# Patient Record
Sex: FEMALE | Race: WHITE | NOT HISPANIC OR LATINO | Employment: UNEMPLOYED | URBAN - METROPOLITAN AREA
[De-identification: names, ages, dates, MRNs, and addresses within clinical notes are randomized per-mention and may not be internally consistent; named-entity substitution may affect disease eponyms.]

---

## 2017-01-03 ENCOUNTER — HOSPITAL ENCOUNTER (EMERGENCY)
Facility: HOSPITAL | Age: 47
Discharge: HOME | End: 2017-01-03
Attending: EMERGENCY MEDICINE
Payer: MEDICAID

## 2017-01-03 VITALS
OXYGEN SATURATION: 99 % | SYSTOLIC BLOOD PRESSURE: 104 MMHG | TEMPERATURE: 98 F | BODY MASS INDEX: 37.76 KG/M2 | RESPIRATION RATE: 16 BRPM | WEIGHT: 200 LBS | HEIGHT: 61 IN | DIASTOLIC BLOOD PRESSURE: 64 MMHG | HEART RATE: 59 BPM

## 2017-01-03 DIAGNOSIS — R07.9 CHEST PAIN, UNSPECIFIED TYPE: Primary | ICD-10-CM

## 2017-01-03 LAB
ALBUMIN SERPL BCP-MCNC: 3.8 G/DL
ALP SERPL-CCNC: 84 U/L
ALT SERPL W/O P-5'-P-CCNC: 13 U/L
ANION GAP SERPL CALC-SCNC: 7 MMOL/L
AST SERPL-CCNC: 14 U/L
BASOPHILS # BLD AUTO: 0.07 K/UL
BASOPHILS NFR BLD: 0.7 %
BILIRUB SERPL-MCNC: 0.2 MG/DL
BNP SERPL-MCNC: 31 PG/ML
BUN SERPL-MCNC: 23 MG/DL
CALCIUM SERPL-MCNC: 8.9 MG/DL
CHLORIDE SERPL-SCNC: 110 MMOL/L
CK SERPL-CCNC: 71 U/L
CO2 SERPL-SCNC: 24 MMOL/L
CREAT SERPL-MCNC: 1 MG/DL
DIFFERENTIAL METHOD: NORMAL
EOSINOPHIL # BLD AUTO: 0.4 K/UL
EOSINOPHIL NFR BLD: 3.5 %
ERYTHROCYTE [DISTWIDTH] IN BLOOD BY AUTOMATED COUNT: 14 %
EST. GFR  (AFRICAN AMERICAN): >60 ML/MIN/1.73 M^2
EST. GFR  (NON AFRICAN AMERICAN): >60 ML/MIN/1.73 M^2
GLUCOSE SERPL-MCNC: 94 MG/DL
HCT VFR BLD AUTO: 38.2 %
HGB BLD-MCNC: 12.3 G/DL
INR PPP: 0.9
LYMPHOCYTES # BLD AUTO: 2.8 K/UL
LYMPHOCYTES NFR BLD: 28.4 %
MAGNESIUM SERPL-MCNC: 2.3 MG/DL
MCH RBC QN AUTO: 29.6 PG
MCHC RBC AUTO-ENTMCNC: 32.2 %
MCV RBC AUTO: 92 FL
MONOCYTES # BLD AUTO: 0.7 K/UL
MONOCYTES NFR BLD: 6.7 %
NEUTROPHILS # BLD AUTO: 6 K/UL
NEUTROPHILS NFR BLD: 60.4 %
PLATELET # BLD AUTO: 286 K/UL
PMV BLD AUTO: 10.6 FL
POCT GLUCOSE: 101 MG/DL (ref 70–110)
POTASSIUM SERPL-SCNC: 4.7 MMOL/L
PROT SERPL-MCNC: 6.8 G/DL
PROTHROMBIN TIME: 10 SEC
RBC # BLD AUTO: 4.16 M/UL
SODIUM SERPL-SCNC: 141 MMOL/L
TROPONIN I SERPL DL<=0.01 NG/ML-MCNC: <0.006 NG/ML
WBC # BLD AUTO: 9.91 K/UL

## 2017-01-03 PROCEDURE — 82550 ASSAY OF CK (CPK): CPT

## 2017-01-03 PROCEDURE — 83880 ASSAY OF NATRIURETIC PEPTIDE: CPT

## 2017-01-03 PROCEDURE — 84484 ASSAY OF TROPONIN QUANT: CPT

## 2017-01-03 PROCEDURE — 99284 EMERGENCY DEPT VISIT MOD MDM: CPT | Mod: 25

## 2017-01-03 PROCEDURE — 80053 COMPREHEN METABOLIC PANEL: CPT

## 2017-01-03 PROCEDURE — 85025 COMPLETE CBC W/AUTO DIFF WBC: CPT

## 2017-01-03 PROCEDURE — 83735 ASSAY OF MAGNESIUM: CPT

## 2017-01-03 PROCEDURE — 85610 PROTHROMBIN TIME: CPT

## 2017-01-03 PROCEDURE — 93005 ELECTROCARDIOGRAM TRACING: CPT

## 2017-01-03 PROCEDURE — 82962 GLUCOSE BLOOD TEST: CPT

## 2017-01-03 RX ORDER — ISOSORBIDE MONONITRATE 30 MG/1
30 TABLET, EXTENDED RELEASE ORAL DAILY
COMMUNITY

## 2017-01-03 RX ORDER — NITROGLYCERIN 0.4 MG/1
0.4 TABLET SUBLINGUAL EVERY 5 MIN PRN
Qty: 25 TABLET | Refills: 6 | Status: SHIPPED | OUTPATIENT
Start: 2017-01-03 | End: 2018-01-03

## 2017-01-03 RX ORDER — BUPROPION HYDROCHLORIDE 150 MG/1
150 TABLET ORAL DAILY
COMMUNITY

## 2017-01-03 RX ORDER — POTASSIUM CHLORIDE 750 MG/1
10 TABLET, EXTENDED RELEASE ORAL DAILY
COMMUNITY

## 2017-01-03 RX ORDER — ASPIRIN 325 MG
325 TABLET ORAL DAILY
COMMUNITY

## 2017-01-03 RX ORDER — LISINOPRIL 40 MG/1
40 TABLET ORAL 2 TIMES DAILY
COMMUNITY

## 2017-01-03 RX ORDER — METOPROLOL TARTRATE 50 MG/1
50 TABLET ORAL 2 TIMES DAILY
COMMUNITY

## 2017-01-03 NOTE — ED PROVIDER NOTES
Encounter Date: 1/3/2017       History     Chief Complaint   Patient presents with    Chest Pain     left side chest pain radiating to left jaw since waking this morning, denies n/v or diaphoresis but CP relieved by NTG per EMS     Review of patient's allergies indicates:   Allergen Reactions    Augmentin  [amoxicillin-pot clavulanate]      Other reaction(s): Rash     HPI   Ms. Warner is a 47 yo female with PMHx of HTN, HLD, DM and CAD s/p stent placement (2015 and 2016) presenting with complains of left sided chest pain. Patient states that her chest pain started last night while she was cooking. She states that the pain was 4/10 at the time and didn't think much of it at the time, therefore did not seek any medical attention. Patient states that the pain worsened to 8/10 this AM, therefore called EMS. Her pain was relieved by nitroglycerine given by EMS. Patient describes a stabbing/pressure-like pain in the left chest with radiation to the left shoulder and bilateral jaw. She also reports having blurry vision but otherwise denies any F/C, SOB, abdominal pain or lower extremity pain.    Patient reports being stressed over the past few weeks due to the loss of her friend on 16.    Past Medical History   Diagnosis Date    Anxiety     Coronary artery disease     Depression     Diabetes mellitus     Headache(784.0)     High cholesterol     Hypertension     Migraine headache     Seizures     Umbilical hernia      No past medical history pertinent negatives.  Past Surgical History   Procedure Laterality Date    Hernia repair           Appendectomy      Cholecystectomy      Hysterectomy       section, classic       4 total    Cardiac surgery       Family History   Problem Relation Age of Onset    Throat cancer Father     Heart attack Father     Uterine cancer Mother     Heart attack Mother     Lung cancer Paternal Aunt     Bone cancer Paternal Aunt     Prostate cancer  Paternal Uncle     Diabetes Maternal Grandmother      Social History   Substance Use Topics    Smoking status: Current Every Day Smoker     Packs/day: 0.50     Types: Cigarettes    Smokeless tobacco: None    Alcohol use No     Review of Systems   Constitutional: Negative for chills and fever.   Respiratory: Negative for cough, shortness of breath and wheezing.    Cardiovascular: Positive for chest pain. Negative for leg swelling.   Gastrointestinal: Negative for abdominal pain, nausea and vomiting.   Musculoskeletal: Negative for back pain and myalgias.   Neurological: Positive for headaches. Negative for dizziness.   Psychiatric/Behavioral: Positive for dysphoric mood. The patient is not nervous/anxious and is not hyperactive.      Physical Exam   Initial Vitals   BP Pulse Resp Temp SpO2   01/03/17 1054 01/03/17 1054 01/03/17 1054 01/03/17 1054 01/03/17 1054   130/80 74 18 98.1 °F (36.7 °C) 98 %     Physical Exam    Constitutional: She appears well-developed and well-nourished. She is not diaphoretic. No distress.   HENT:   Head: Normocephalic and atraumatic.   Eyes: EOM are normal. Pupils are equal, round, and reactive to light. No scleral icterus.   Cardiovascular: Normal rate, regular rhythm and normal heart sounds.   No murmur heard.  Pulmonary/Chest: Breath sounds normal. No respiratory distress. She has no wheezes. She exhibits tenderness (Left chest TTP).   Abdominal: Soft. Bowel sounds are normal. She exhibits no distension. There is no tenderness.   Musculoskeletal: Normal range of motion. She exhibits no edema or tenderness.   Neurological: She is alert and oriented to person, place, and time.   Psychiatric: Her speech is normal. Judgment and thought content normal. She is slowed. Cognition and memory are normal. She exhibits a depressed mood.         ED Course   Procedures  Labs Reviewed   COMPREHENSIVE METABOLIC PANEL - Abnormal; Notable for the following:        Result Value    BUN, Bld 23 (*)      Anion Gap 7 (*)     All other components within normal limits   CBC W/ AUTO DIFFERENTIAL   CK   TROPONIN I   PROTIME-INR   B-TYPE NATRIURETIC PEPTIDE   MAGNESIUM   POCT GLUCOSE     Imaging Results         X-Ray Chest 1 View (Final result) Result time:  01/03/17 11:57:08    Final result by Teresa Brock MD (01/03/17 11:57:08)    Impression:      No acute cardiopulmonary process identified.      Electronically signed by: TERESA BROCK MD  Date:     01/03/17  Time:    11:57     Narrative:    Chest AP single view.  Comparison: 7/2015.    Mediastinal structures are midline.  Cardiac silhouette is normal and stable in size.  Suspected coronary artery stent noted.  Lungs are clear and symmetrically expanded.  No evidence of focal consolidative process, pneumothorax, or significant effusion.  Bones appear intact.  No free air visualized beneath the diaphragm.                      Medical Decision Making:   Clinical Tests:   Lab Tests: Ordered and Reviewed  The following lab test(s) were unremarkable: CBC, CMP and Troponin  Radiological Study: Ordered and Reviewed  Medical Tests: Ordered and Reviewed  ED Management:  46-year-old female stable angina.  Workup here in the ED is unremarkable. Results have been discussed with Dr. Manley, patient's cardiologist, who agrees she may be safely discharged to home.  Patient has an appointment with Dr. Manley tomorrow which is been urged to keep.  She'll also return here for any further chest pain occurring prior to follow-up.                   ED Course     Clinical Impression:   The encounter diagnosis was Chest pain, unspecified type.          Gil Saldaña MD  01/03/17 0394

## 2017-01-03 NOTE — ED NOTES
Pt reports chest pain that stared last night. Rated 4/10 last night and woke up this morning with same pain, but rated 8/10 and with vision changes. States that she was out of nitro and called her cardiologist to refill it who told her to come to the ED for evaluation. In route, EMS administered ASA 325mg, Nitro SL x 5 sprays, and 1 inch on nitro paste on the chest. Pt now rates her pain 1/10. Denies SOB, N/V, and headache. VSS.    APPEARANCE: Alert, oriented and in no acute distress.  CARDIAC: Normal rate and rhythm, no murmur heard.   PERIPHERAL VASCULAR: peripheral pulses present. Normal cap refill. No edema. Warm to touch.    RESPIRATORY:Normal rate and effort, breath sounds clear bilaterally throughout chest. Respirations are equal and unlabored no obvious signs of distress.  GASTRO: soft, bowel sounds normal, no tenderness, no abdominal distention.  MUSC: Full ROM. No bony tenderness or soft tissue tenderness. No obvious deformity.  SKIN: Skin is warm and dry, normal skin turgor, mucous membranes moist.  NEURO: 5/5 strength major flexors/extensors bilaterally. Sensory intact to light touch bilaterally. Adam coma scale: eyes open spontaneously-4, oriented & converses-5, obeys commands-6. No neurological abnormalities.   MENTAL STATUS: awake, alert and aware of environment.  EYE: PERRL, both eyes: pupils brisk and reactive to light. Normal size.  ENT: EARS: no obvious drainage. NOSE: no active bleeding.

## 2017-01-03 NOTE — DISCHARGE INSTRUCTIONS
Stable Angina  The chest discomfort you have appears to be coming from your heart -- a condition called angina. Angina is a pain in the heart due to poor blood flow to the heart muscle. This can occur when plaque builds up on the artery wall or a blood clot forms in one or more of the small blood vessels that deliver oxygen to the heart muscle. Plaque is a fatty material made up of cholesterol, calcium deposits, and other materials  and other particles due to inflammation.  Exercise, increased activity, emotional upset, or stress can trigger this pain. With proper treatment and lifestyle changes to reduce risk factors, most people with angina are able to maintain a full and active life.  Angina is not a heart attack. But if angina pain is severe or prolonged, it is a sign of an impending heart attack, also called acute myocardial infarction, or AMI. Your angina is under control at this time. Therefore, it is safe for you to go home. Follow up as instructed by your doctor for any further tests and office visits.    Home care  · Rest at home today and avoid any emotional or physically strenuous activity.  · Take medicine (usually nitroglycerin) for chest pain exactly as prescribed. Keep your nitroglycerin with you at all times.  · When taking nitroglycerin for angina, sit or lie down. The medicine may make you feel dizzy.  ¨ Place one tablet under your tongue, or between your lip and gum, or between your cheek and gum. Let the tablet dissolve completely; do not chew or swallow the tablet.  ¨ If you use a spray, then spray once on or under your tongue. Do not inhale. Right after you use the spray, close your mouth. Wait a few seconds before you swallow.  ¨ After taking one tablet or spraying once, continue sitting or lying for 5 minutes.  ¨ If the angina goes away completely, rest awhile and continue your normal routine.  Note: Your healthcare provider may give you slightly different instructions than those above. If  so, follow them carefully.  Prevention  · Learn how to take your own blood pressure. Keep a record of your results. Ask your doctor which readings mean that you need medical attention. Reduce salt intake and follow lifestyle change instructions if you have high blood pressure (hypertension).  · Maintain a healthy weight. Get help to lose any extra pounds. Talk to your doctor about a safe diet program. Sudden large weight losses can be dangerous.  · If you have diabetes, talk to your doctor about healthy control of your blood sugar.  · Begin an exercise program. Ask your doctor how to get started. You can benefit from simple activities such as walking or gardening. Short, high intensity exercise sessions may also be beneficial.  · Break the smoking habit. Enroll in a stop-smoking program to improve your chances of success.  · Get adequate rest.  · Avoid stressful situations. Learn stress-management techniques.  Follow-up care  Follow up with your doctor, or as advised.  If an X-ray was done , it will be reviewed by another specialist. You will be notified of any new findings that may affect your care.  Call 911  This is the fastest and safest way to get to the nearest emergency department. The paramedics can also start treatment on the way to the hospital, saving valuable time for your heart.  · If angina gets worse, it continues, or if it stops and returns, call 911 immediately, Do not delay. You may be having a heart attack.  · After you call 911, take a second nitroglycerine tablet or spray unless instructed otherwise. When repeating doses, sit down if possible because it can make you feel lightheaded or dizzy. Wait another 5 minutes. If the angina still does not go away, take a third tablet or spray. Do not take more than 3 tablets or sprays within 15 minutes. Stay on the phone with 911 for further instructions.  · Your healthcare provider may give you slightly different instructions than those above. If so,  "follow them carefully.  Don't wait until your symptoms are severe to call 911. Other reasons to call 911 besides chest pain include:  · Trouble breathing  · Feeling lightheaded, faint, or dizzy  · Rapid heart beat  · Slower than usual heart rate compared to your normal  · Angina with weakness, dizziness, fainting, heavy sweating, nausea, or vomiting  · Extreme drowsiness, or confusion  · Weakness of an arm or leg or on one side of the face  · Difficulty with speech or vision  When to seek medical advice  Remember, the signs and symptoms of a heart attack are not always like they are on TV. Sometimes they are not so obvious. You may only feel weak or just "not right." If it is not clear or if you have any doubt, call for advice.  · Seek help if there is a change in the type of pain, if it feels different, or if your symptoms are mild.  · Do not drive yourself. Have someone else drive. If no one can drive you, call 911.  · If your doctor has given you medicine to take when you have symptoms, take them, but do not delay getting  help.  · Do not delay. Fast diagnosis and treatment can prevent or  limit the amount of heart damage during a heart attack or stroke.  · Do not go to your doctor's office or a clinic because they may not be able to provide all the testing and treatment you need.  © 8741-6084 [employer]. [address] [place] [address], [place] [address]. All rights reserved. This information is not intended as a substitute for professional medical care. Always follow your healthcare professional's instructions.        "

## 2017-01-03 NOTE — ED AVS SNAPSHOT
[facility]  [address] [facility]  [place] [address]               Dominga Warner   1/3/2017 10:55 AM   ED    Description:  Female : 1970   Department:  [facility]           Your Care was Coordinated By:     Provider Role From To    Gil Saldaña MD Attending Provider 17 1100 --      Reason for Visit     Chest Pain           Diagnoses this Visit        Comments    Chest pain, unspecified type    -  Primary       ED Disposition     None           To Do List           Follow-up Information     Follow up with Felicitas Manley MD.    Specialty:  Cardiology    Contact information:    [address]  [address]  [place] [address]  [phone]         These Medications        Disp Refills Start End    nitroGLYCERIN (NITROSTAT) 0.4 MG SL tablet 25 tablet 6 1/3/2017 1/3/2018    Place 1 tablet (0.4 mg total) under the tongue every 5 (five) minutes as needed for Chest pain. - Sublingual    Pharmacy: [address] Ph #: [phone]         [facility] On Call     [facility] Care [facility] -  Assistance  Registered nurses in the [facility] provide clinical advisement, health education, appointment booking, and other advisory services.  Call for this free service at [phone].             Medications           Message regarding Medications     Verify the changes and/or additions to your medication regime listed below are the same as discussed with your clinician today.  If any of these changes or additions are incorrect, please notify your healthcare provider.        START taking these NEW medications        Refills    nitroGLYCERIN (NITROSTAT) 0.4 MG SL tablet 6    Sig: Place 1 tablet (0.4 mg total) under the tongue every 5 (five) minutes as needed for Chest pain.    Class: Print    Route: Sublingual      STOP taking these medications     hydrocodone-acetaminophen 10-325mg (NORCO)   mg Tab Take 1 tablet by mouth every 4 (four) hours as needed.    ondansetron (ZOFRAN) 4 MG tablet Take 1 tablet (4 mg total) by mouth every 6 (six) hours as needed for Nausea.    oxycodone-acetaminophen 5-325 mg (PERCOCET) 5-325 mg per tablet Take 1-2 tablets by mouth every 4 (four) hours as needed for Pain.    polyethylene glycol (GLYCOLAX) 17 gram PwPk Take 17 g by mouth once daily.    promethazine (PHENERGAN) 25 MG suppository Place 1 suppository (25 mg total) rectally every 6 (six) hours as needed for Nausea.    psyllium husk, aspartame, (METAMUCIL FIBER SINGLES) 3.4 gram PwPk Take 1 Package by mouth once daily.    ACETAMINOPHEN WITH CODEINE (TYLENOL-CODEINE #3 ORAL) Take by mouth.    butalbital-acetaminop-caf-cod (FIORICET WITH CODEINE) -88-30 mg Cap Take by mouth. 1 Capsule Oral Twice a day    magnesium oxide-Mg AA chelate (MG-PLUS-PROTEIN) 133 mg Tab Take 400 mg by mouth.    METHOCARBAMOL (ROBAXIN ORAL) Take by mouth.    metoprolol succinate (TOPROL-XL) 50 MG 24 hr tablet Take 50 mg by mouth once daily.    pantoprazole (PROTONIX) 40 MG tablet Take 40 mg by mouth once daily.    pravastatin (PRAVACHOL) 40 MG tablet Take 80 mg by mouth. Tablet Oral At bedtime    promethazine (PHENERGAN) 25 MG tablet Take 25 mg by mouth every 4 (four) hours.    zolpidem (AMBIEN) 10 mg Tab Take 5 mg by mouth nightly as needed.    aspirin 162 MG TbEC Take 325 mg by mouth once daily.            Verify that the below list of medications is an accurate representation of the medications you are currently taking.  If none reported, the list may be blank. If incorrect, please contact your healthcare provider. Carry this list with you in case of emergency.           Current Medications     aspirin 325 MG tablet Take 325 mg by mouth once daily.    buPROPion (WELLBUTRIN XL) 150 MG TB24 tablet Take 150 mg by mouth once daily.    cyanocobalamin, vitamin B-12, (B-12 KIT) 1,000 mcg/mL Kit Inject as directed every 7 days.     "isosorbide mononitrate (IMDUR) 30 MG 24 hr tablet Take 30 mg by mouth once daily.    lisinopril (PRINIVIL,ZESTRIL) 40 MG tablet Take 40 mg by mouth 2 (two) times daily.    metoprolol tartrate (LOPRESSOR) 50 MG tablet Take 50 mg by mouth 2 (two) times daily.    potassium chloride SA (K-DUR,KLOR-CON) 10 MEQ tablet Take 10 mEq by mouth once daily.    alprazolam (XANAX) 2 MG tablet Take 2 mg by mouth.  Tablet Oral Twice a day     amlodipine (NORVASC) 10 MG tablet Take 10 mg by mouth once daily.    atorvastatin (LIPITOR) 40 MG tablet Take 40 mg by mouth once daily.    buprenorphine-naloxone 2-0.5 mg (SUBOXONE) 2-0.5 mg Subl Place 8 mg under the tongue 3 (three) times daily.     butalbital-acetaminophen-caffeine -40 mg (FIORICET, ESGIC) -40 mg per tablet Take 1 tablet by mouth every 4 (four) hours as needed for Pain.    CHOLECALCIFEROL, VITAMIN D3, (VITAMIN D3 ORAL) Take 50,000 Units by mouth once a week.     citalopram (CELEXA) 40 MG tablet Take 40 mg by mouth once daily.    clopidogrel (PLAVIX) 75 mg tablet Take 75 mg by mouth once daily.    furosemide (LASIX) 20 MG tablet Take 40 mg by mouth 2 (two) times daily.     nitroGLYCERIN (NITROSTAT) 0.4 MG SL tablet Place 1 tablet (0.4 mg total) under the tongue every 5 (five) minutes as needed for Chest pain.    topiramate (TOPAMAX) 50 MG tablet Take 100 mg by mouth 3 (three) times daily.     trazodone (DESYREL) 50 MG tablet Take 50 mg by mouth every evening.           Clinical Reference Information           Your Vitals Were     BP Pulse Temp Resp Height Weight    130/80 (BP Location: Right arm, Patient Position: Sitting) 74 98.1 °F (36.7 °C) (Oral) 18 5' 1" (1.549 m) 90.7 kg (200 lb)    SpO2 BMI             98% 37.79 kg/m2         Allergies as of 1/3/2017        Reactions    Augmentin  [Amoxicillin-pot Clavulanate]     Other reaction(s): Rash      Immunizations Administered on Date of Encounter - 1/3/2017     None      ED Micro, Lab, POCT     Start Ordered       " Status Ordering Provider    01/03/17 1118 01/03/17 1117  CBC auto differential  STAT      Final result     01/03/17 1118 01/03/17 1117  Comprehensive metabolic panel  STAT      Final result     01/03/17 1118 01/03/17 1117  CPK  STAT      Final result     01/03/17 1118 01/03/17 1117  Troponin I  STAT      Final result     01/03/17 1118 01/03/17 1117  Protime-INR  STAT      Final result     01/03/17 1118 01/03/17 1117  Brain natriuretic peptide  STAT      Final result     01/03/17 1118 01/03/17 1117  Magnesium  STAT      Final result     01/03/17 1108 01/03/17 1108  POCT glucose  Once      Final result       ED Imaging Orders     Start Ordered       Status Ordering Provider    01/03/17 1118 01/03/17 1117  X-Ray Chest 1 View  1 time imaging      Final result         Discharge Instructions         Stable Angina  The chest discomfort you have appears to be coming from your heart -- a condition called angina. Angina is a pain in the heart due to poor blood flow to the heart muscle. This can occur when plaque builds up on the artery wall or a blood clot forms in one or more of the small blood vessels that deliver oxygen to the heart muscle. Plaque is a fatty material made up of cholesterol, calcium deposits, and other materials  and other particles due to inflammation.  Exercise, increased activity, emotional upset, or stress can trigger this pain. With proper treatment and lifestyle changes to reduce risk factors, most people with angina are able to maintain a full and active life.  Angina is not a heart attack. But if angina pain is severe or prolonged, it is a sign of an impending heart attack, also called acute myocardial infarction, or AMI. Your angina is under control at this time. Therefore, it is safe for you to go home. Follow up as instructed by your doctor for any further tests and office visits.    Home care  · Rest at home today and avoid any emotional or physically strenuous activity.  · Take medicine  (usually nitroglycerin) for chest pain exactly as prescribed. Keep your nitroglycerin with you at all times.  · When taking nitroglycerin for angina, sit or lie down. The medicine may make you feel dizzy.  ¨ Place one tablet under your tongue, or between your lip and gum, or between your cheek and gum. Let the tablet dissolve completely; do not chew or swallow the tablet.  ¨ If you use a spray, then spray once on or under your tongue. Do not inhale. Right after you use the spray, close your mouth. Wait a few seconds before you swallow.  ¨ After taking one tablet or spraying once, continue sitting or lying for 5 minutes.  ¨ If the angina goes away completely, rest awhile and continue your normal routine.  Note: Your healthcare provider may give you slightly different instructions than those above. If so, follow them carefully.  Prevention  · Learn how to take your own blood pressure. Keep a record of your results. Ask your doctor which readings mean that you need medical attention. Reduce salt intake and follow lifestyle change instructions if you have high blood pressure (hypertension).  · Maintain a healthy weight. Get help to lose any extra pounds. Talk to your doctor about a safe diet program. Sudden large weight losses can be dangerous.  · If you have diabetes, talk to your doctor about healthy control of your blood sugar.  · Begin an exercise program. Ask your doctor how to get started. You can benefit from simple activities such as walking or gardening. Short, high intensity exercise sessions may also be beneficial.  · Break the smoking habit. Enroll in a stop-smoking program to improve your chances of success.  · Get adequate rest.  · Avoid stressful situations. Learn stress-management techniques.  Follow-up care  Follow up with your doctor, or as advised.  If an X-ray was done , it will be reviewed by another specialist. You will be notified of any new findings that may affect your care.  Call 911  This is  "the fastest and safest way to get to the nearest emergency department. The paramedics can also start treatment on the way to the hospital, saving valuable time for your heart.  · If angina gets worse, it continues, or if it stops and returns, call 911 immediately, Do not delay. You may be having a heart attack.  · After you call 911, take a second nitroglycerine tablet or spray unless instructed otherwise. When repeating doses, sit down if possible because it can make you feel lightheaded or dizzy. Wait another 5 minutes. If the angina still does not go away, take a third tablet or spray. Do not take more than 3 tablets or sprays within 15 minutes. Stay on the phone with 911 for further instructions.  · Your healthcare provider may give you slightly different instructions than those above. If so, follow them carefully.  Don't wait until your symptoms are severe to call 911. Other reasons to call 911 besides chest pain include:  · Trouble breathing  · Feeling lightheaded, faint, or dizzy  · Rapid heart beat  · Slower than usual heart rate compared to your normal  · Angina with weakness, dizziness, fainting, heavy sweating, nausea, or vomiting  · Extreme drowsiness, or confusion  · Weakness of an arm or leg or on one side of the face  · Difficulty with speech or vision  When to seek medical advice  Remember, the signs and symptoms of a heart attack are not always like they are on TV. Sometimes they are not so obvious. You may only feel weak or just "not right." If it is not clear or if you have any doubt, call for advice.  · Seek help if there is a change in the type of pain, if it feels different, or if your symptoms are mild.  · Do not drive yourself. Have someone else drive. If no one can drive you, call 911.  · If your doctor has given you medicine to take when you have symptoms, take them, but do not delay getting  help.  · Do not delay. Fast diagnosis and treatment can prevent or  limit the amount of heart " damage during a heart attack or stroke.  · Do not go to your doctor's office or a clinic because they may not be able to provide all the testing and treatment you need.  © 8418-8262 [employer]. [address] [place] [address], [place] [address]. All rights reserved. This information is not intended as a substitute for professional medical care. Always follow your healthcare professional's instructions.          [facility] Sign-Up     Activating your [facility] account is as easy as 1-2-3!     1) Visit [email or web address], select Sign Up Now, enter this activation code and your date of birth, then select Next.  [email or web address]  Expires: 2/17/2017 12:35 PM      2) Create a username and password to use when you visit [facility] in the future and select a security question in case you lose your password and select Next.    3) Enter your e-mail address and click Sign Up!    Additional Information  If you have questions, please e-mail [email or web address] or call [phone] to talk to our [facility] staff. Remember, [facility] is NOT to be used for urgent needs. For medical emergencies, dial 911.         Smoking Cessation     If you would like to quit smoking:   You may be eligible for free services if you are a [place] resident and started smoking cigarettes before September 1, 1988.  Call the Smoking Cessation Trust (SCT) toll free at (490) 871-2337 or (874) 461-5633.   Call 1-800-QUIT-NOW if you do not meet the above criteria.             [facility] complies with applicable Federal civil rights laws and does not discriminate on the basis of race, color, national origin, age, disability, or sex.        Language Assistance Services     ATTENTION: Language assistance services are available, free of charge. Please call [phone].      ATENCIÓN: Si nunola danny, tiene a suarez disposición servicios gratuitos de asistencia lingüística. Llame al [phone].     CHÚ Ý: N?u b?n nói Ti?ng Vi?t, có  các d?ch v? h? tr? hilario ng? mi?n phí dành cho b?n. G?i s? [phone].

## 2017-01-28 ENCOUNTER — HOSPITAL ENCOUNTER (EMERGENCY)
Facility: HOSPITAL | Age: 47
Discharge: HOME | End: 2017-01-28
Attending: EMERGENCY MEDICINE
Payer: MEDICAID

## 2017-01-28 VITALS
SYSTOLIC BLOOD PRESSURE: 125 MMHG | WEIGHT: 210 LBS | RESPIRATION RATE: 20 BRPM | OXYGEN SATURATION: 95 % | TEMPERATURE: 98 F | DIASTOLIC BLOOD PRESSURE: 84 MMHG | HEIGHT: 70 IN | BODY MASS INDEX: 30.06 KG/M2 | HEART RATE: 81 BPM

## 2017-01-28 DIAGNOSIS — N30.00 ACUTE CYSTITIS WITHOUT HEMATURIA: Primary | ICD-10-CM

## 2017-01-28 LAB
BACTERIA #/AREA URNS AUTO: ABNORMAL /HPF
BILIRUB UR QL STRIP: ABNORMAL
CLARITY UR REFRACT.AUTO: CLEAR
COLOR UR AUTO: ABNORMAL
GLUCOSE UR QL STRIP: NEGATIVE
HGB UR QL STRIP: NEGATIVE
KETONES UR QL STRIP: NEGATIVE
LEUKOCYTE ESTERASE UR QL STRIP: ABNORMAL
MICROSCOPIC COMMENT: ABNORMAL
NITRITE UR QL STRIP: POSITIVE
PH UR STRIP: 7 [PH] (ref 5–8)
PROT UR QL STRIP: ABNORMAL
RBC #/AREA URNS AUTO: 3 /HPF (ref 0–4)
SP GR UR STRIP: 1.02 (ref 1–1.03)
URN SPEC COLLECT METH UR: ABNORMAL
UROBILINOGEN UR STRIP-ACNC: ABNORMAL EU/DL
WBC #/AREA URNS AUTO: 15 /HPF (ref 0–5)

## 2017-01-28 PROCEDURE — 87077 CULTURE AEROBIC IDENTIFY: CPT

## 2017-01-28 PROCEDURE — 87088 URINE BACTERIA CULTURE: CPT

## 2017-01-28 PROCEDURE — 87086 URINE CULTURE/COLONY COUNT: CPT

## 2017-01-28 PROCEDURE — 25000003 PHARM REV CODE 250: Performed by: EMERGENCY MEDICINE

## 2017-01-28 PROCEDURE — 87186 SC STD MICRODIL/AGAR DIL: CPT

## 2017-01-28 PROCEDURE — 81000 URINALYSIS NONAUTO W/SCOPE: CPT

## 2017-01-28 PROCEDURE — 99284 EMERGENCY DEPT VISIT MOD MDM: CPT

## 2017-01-28 RX ORDER — SULFAMETHOXAZOLE AND TRIMETHOPRIM 800; 160 MG/1; MG/1
1 TABLET ORAL 2 TIMES DAILY
Qty: 14 TABLET | Refills: 0 | Status: SHIPPED | OUTPATIENT
Start: 2017-01-28 | End: 2017-01-28 | Stop reason: CLARIF

## 2017-01-28 RX ORDER — HYDROCODONE BITARTRATE AND ACETAMINOPHEN 5; 325 MG/1; MG/1
1 TABLET ORAL
Status: COMPLETED | OUTPATIENT
Start: 2017-01-28 | End: 2017-01-28

## 2017-01-28 RX ORDER — HYDROCODONE BITARTRATE AND ACETAMINOPHEN 5; 325 MG/1; MG/1
1 TABLET ORAL EVERY 6 HOURS PRN
Qty: 4 TABLET | Refills: 0 | Status: SHIPPED | OUTPATIENT
Start: 2017-01-28 | End: 2017-06-22

## 2017-01-28 RX ORDER — SULFAMETHOXAZOLE AND TRIMETHOPRIM 800; 160 MG/1; MG/1
1 TABLET ORAL 2 TIMES DAILY
Qty: 20 TABLET | Refills: 0 | Status: SHIPPED | OUTPATIENT
Start: 2017-01-28 | End: 2017-02-07

## 2017-01-28 RX ADMIN — HYDROCODONE BITARTRATE AND ACETAMINOPHEN 1 TABLET: 5; 325 TABLET ORAL at 08:01

## 2017-01-28 NOTE — ED AVS SNAPSHOT
[facility]  [address]  [place] [address]               Dominga Warner   2017  8:11 PM   ED    Description:  Female : 1970   Department:  [facility]           Your Care was Coordinated By:     Provider Role From To    Eugene Mariano MD Attending Provider 17 --      Reason for Visit     Urinary Retention     Abdominal Pain           Diagnoses this Visit        Comments    Acute cystitis without hematuria    -  Primary       ED Disposition     ED Disposition Condition Comment    Discharge             To Do List           Follow-up Information     Follow up with Patricia Page MD In 2 days.    Specialty:  Internal Medicine    Why:  if not improved    Contact information:    [address] [place] [address] [phone]         These Medications        Disp Refills Start End    sulfamethoxazole-trimethoprim 800-160mg (BACTRIM DS) 800-160 mg Tab 20 tablet 0 2017    Take 1 tablet by mouth 2 (two) times daily. - Oral    Pharmacy: [facility] [address] Ph #: [phone]       hydrocodone-acetaminophen 5-325mg (NORCO) 5-325 mg per tablet 4 tablet 0 2017     Take 1 tablet by mouth every 6 (six) hours as needed for Pain. - Oral    Pharmacy: [place] [facility] [address] [facility] [address] Ph #: [phone]         [facility] On Call     [facility] On Call Nurse Care Line -  Assistance  Registered nurses in the [facility] provide clinical advisement, health education, appointment booking, and other advisory services.  Call for this free service at [phone].             Medications           Message regarding Medications     Verify the changes and/or additions to your medication regime listed below are the same as discussed with your clinician today.  If any of these changes or additions are incorrect, please notify your healthcare provider.        START taking  these NEW medications        Refills    sulfamethoxazole-trimethoprim 800-160mg (BACTRIM DS) 800-160 mg Tab 0    Sig: Take 1 tablet by mouth 2 (two) times daily.    Class: Print    Route: Oral    hydrocodone-acetaminophen 5-325mg (NORCO) 5-325 mg per tablet 0    Sig: Take 1 tablet by mouth every 6 (six) hours as needed for Pain.    Class: Print    Route: Oral      These medications were administered today        Dose Freq    hydrocodone-acetaminophen 5-325mg per tablet 1 tablet 1 tablet ED 1 Time    Sig: Take 1 tablet by mouth ED 1 Time.    Class: Normal    Route: Oral           Verify that the below list of medications is an accurate representation of the medications you are currently taking.  If none reported, the list may be blank. If incorrect, please contact your healthcare provider. Carry this list with you in case of emergency.           Current Medications     topiramate (TOPAMAX) 50 MG tablet Take 100 mg by mouth 3 (three) times daily.     alprazolam (XANAX) 2 MG tablet Take 2 mg by mouth.  Tablet Oral Twice a day     amlodipine (NORVASC) 10 MG tablet Take 10 mg by mouth once daily.    aspirin 325 MG tablet Take 325 mg by mouth once daily.    atorvastatin (LIPITOR) 40 MG tablet Take 40 mg by mouth once daily.    buprenorphine-naloxone 2-0.5 mg (SUBOXONE) 2-0.5 mg Subl Place 8 mg under the tongue 3 (three) times daily.     buPROPion (WELLBUTRIN XL) 150 MG TB24 tablet Take 150 mg by mouth once daily.    butalbital-acetaminophen-caffeine -40 mg (FIORICET, ESGIC) -40 mg per tablet Take 1 tablet by mouth every 4 (four) hours as needed for Pain.    CHOLECALCIFEROL, VITAMIN D3, (VITAMIN D3 ORAL) Take 50,000 Units by mouth once a week.     citalopram (CELEXA) 40 MG tablet Take 40 mg by mouth once daily.    clopidogrel (PLAVIX) 75 mg tablet Take 75 mg by mouth once daily.    cyanocobalamin, vitamin B-12, (B-12 KIT) 1,000 mcg/mL Kit Inject as directed every 7 days.    furosemide (LASIX) 20 MG tablet Take  "40 mg by mouth 2 (two) times daily.     hydrocodone-acetaminophen 5-325mg (NORCO) 5-325 mg per tablet Take 1 tablet by mouth every 6 (six) hours as needed for Pain.    isosorbide mononitrate (IMDUR) 30 MG 24 hr tablet Take 30 mg by mouth once daily.    lisinopril (PRINIVIL,ZESTRIL) 40 MG tablet Take 40 mg by mouth 2 (two) times daily.    metoprolol tartrate (LOPRESSOR) 50 MG tablet Take 50 mg by mouth 2 (two) times daily.    nitroGLYCERIN (NITROSTAT) 0.4 MG SL tablet Place 1 tablet (0.4 mg total) under the tongue every 5 (five) minutes as needed for Chest pain.    potassium chloride SA (K-DUR,KLOR-CON) 10 MEQ tablet Take 10 mEq by mouth once daily.    sulfamethoxazole-trimethoprim 800-160mg (BACTRIM DS) 800-160 mg Tab Take 1 tablet by mouth 2 (two) times daily.    trazodone (DESYREL) 50 MG tablet Take 50 mg by mouth every evening.           Clinical Reference Information           Your Vitals Were     BP Pulse Temp Resp Height Weight    122/59 (BP Location: Right arm, Patient Position: Sitting) 86 98 °F (36.7 °C) (Oral) 18 5' 10" (1.778 m) 95.3 kg (210 lb)    SpO2 BMI             96% 30.13 kg/m2         Allergies as of 1/28/2017        Reactions    Augmentin  [Amoxicillin-pot Clavulanate]     Other reaction(s): Rash      Immunizations Administered on Date of Encounter - 1/28/2017     None      ED Micro, Lab, POCT     Start Ordered       Status Ordering Provider    01/28/17 2049 01/28/17 2048  Urine culture  Add-on      Completed     01/28/17 2046 01/28/17 2045    Once,   Status:  Canceled      Canceled     01/28/17 2046 01/28/17 2045    STAT,   Status:  Canceled      Canceled     01/28/17 2031 01/28/17 2030  Urinalysis Clean Catch  STAT      Final result     01/28/17 2030 01/28/17 2030  Urinalysis Microscopic  Once      Final result     01/28/17 2030 01/28/17 2030  Urine culture  Once      In process       ED Imaging Orders     None        Discharge Instructions         Understanding Urinary Tract Infections " (UTIs)  Most UTIs are caused by bacteria, although they may also be caused by viruses or fungi. Bacteria from the bowel are the most common source of infection. The infection may begin because of any of the following:  · Sexual activity. During sex, germs can travel from the penis, vagina, or rectum into the urethra.   · Germs on the skin outside the rectum may travel into the urethra. This is more common in women since the rectum and urethra are closer to each other than in men. Wiping from front to back after using the toilet and keeping the area clean can help prevent germs from getting to the urethra.  · Blockage of urine flow through the urinary tract. If urine sits too long, germs may begin to grow out of control.      Parts of the urinary tract  The infection can occur in any part of the urinary tract.  · The kidneys collect and store urine.  · The ureters carry urine from the kidneys to the bladder.  · The bladder holds urine until you are ready to let it out.  · The urethra carries urine from the bladder out of the body. It is shorter in women, so bacteria can move through it more easily. The urethra is longer in men, so a UTI is less likely to reach the bladder or kidneys in men.  © 8606-9149 [employer] [address] [place] [address], [place] [address]. All rights reserved. This information is not intended as a substitute for professional medical care. Always follow your healthcare professional's instructions.          Your Scheduled Appointments     Feb 03, 2017  7:00 AM CST   MPI Delay with [facility] NM2 INJ   [facility]    [address] [place] [id number] [phone]            Feb 03, 2017  7:30 AM CST   MPI Delay with [facility] NM3   [facility]    [address]  [place] [address]   [phone]            Feb 03, 2017  8:00 AM CST   Nuclear Regadenonsen with CARDIOLOGY, STRESS/TILT TABLE/PAPI   [facility]    [address] [place], [place] [address]  [place] [address] [phone]              [facility] Sign-Up     Activating your [facility] account is as easy as 1-2-3!     1) Visit [email or web address], select Sign Up Now, enter this activation code and your date of birth, then select Next.  [email or web address]  Expires: 2/17/2017 12:35 PM      2) Create a username and password to use when you visit [facility] in the future and select a security question in case you lose your password and select Next.    3) Enter your e-mail address and click Sign Up!    Additional Information  If you have questions, please e-mail [email or web address] or call [phone] to talk to our [facility] staff. Remember, [facility] is NOT to be used for urgent needs. For medical emergencies, dial 911.         Smoking Cessation     If you would like to quit smoking:   You may be eligible for free services if you are a [place] resident and started smoking cigarettes before September 1, 1988.  Call the Smoking Cessation Trust (SCT) toll free at (700) 429-4296 or (501) 322-5664.   Call 6-056-QUIT-NOW if you do not meet the above criteria.             [facility] complies with applicable Federal civil rights laws and does not discriminate on the basis of race, color, national origin, age, disability, or sex.        Language Assistance Services     ATTENTION: Language assistance services are available, free of charge. Please call [phone].      ATENCIÓN: Si habla español, tiene a suarez disposición servicios gratuitos de asistencia lingüística. Llame al [phone].     CHÚ Ý: N?u b?n nói Ti?ng Vi?t, có các d?ch v? h? tr? ngôn ng? mi?n phí dành cho b?n. G?i s? [phone].

## 2017-01-29 NOTE — DISCHARGE INSTRUCTIONS
Understanding Urinary Tract Infections (UTIs)  Most UTIs are caused by bacteria, although they may also be caused by viruses or fungi. Bacteria from the bowel are the most common source of infection. The infection may begin because of any of the following:  · Sexual activity. During sex, germs can travel from the penis, vagina, or rectum into the urethra.   · Germs on the skin outside the rectum may travel into the urethra. This is more common in women since the rectum and urethra are closer to each other than in men. Wiping from front to back after using the toilet and keeping the area clean can help prevent germs from getting to the urethra.  · Blockage of urine flow through the urinary tract. If urine sits too long, germs may begin to grow out of control.      Parts of the urinary tract  The infection can occur in any part of the urinary tract.  · The kidneys collect and store urine.  · The ureters carry urine from the kidneys to the bladder.  · The bladder holds urine until you are ready to let it out.  · The urethra carries urine from the bladder out of the body. It is shorter in women, so bacteria can move through it more easily. The urethra is longer in men, so a UTI is less likely to reach the bladder or kidneys in men.  © 7095-6624 [employer]. [address] [place] [address], [place] [address]. All rights reserved. This information is not intended as a substitute for professional medical care. Always follow your healthcare professional's instructions.

## 2017-01-29 NOTE — ED TRIAGE NOTES
"Pt states "I feel like I'm going to have 20 babies and my abdomen is narinder really bad. I have been taking Azo Cranberry and the regular Azo. I took Ibuprofen and Tylenol." Pt states pain started yesterday. Pt also c/o left flank pain.   "

## 2017-01-29 NOTE — ED PROVIDER NOTES
"Encounter Date: 2017       History     Chief Complaint   Patient presents with    Urinary Retention     Pt states "I feel like I'm going to have 20 babies and my abdomen is narinder really bad. I have been taking Azo Crandberry and the regular Azo. I took Ibuprofen and Tylenol." Pt states pain started yesterday. Pt also c/o left flank pain.     Abdominal Pain     Review of patient's allergies indicates:   Allergen Reactions    Augmentin  [amoxicillin-pot clavulanate]      Other reaction(s): Rash     Patient is a 46 y.o. female presenting with the following complaint: dysuria.   Dysuria    This is a new problem. The current episode started several days ago. The problem occurs every urination. The problem has been gradually worsening. The quality of the pain is described as burning (CRAMPING, FREQUENCY, URGENCY). The pain is at a severity of 10/10. Associated symptoms include frequency and urgency. Pertinent negatives include no discharge. Treatments tried: AZO, AZO CRANBERRY. Past medical history comments: HERNIA SURGERY, MESH WITH COMPLICATIONS, INTESTINAL RESECTION.     Past Medical History   Diagnosis Date    Anxiety     Coronary artery disease     Depression     Diabetes mellitus     Headache(784.0)     High cholesterol     Hypertension     Migraine headache     Seizures     Umbilical hernia      No past medical history pertinent negatives.  Past Surgical History   Procedure Laterality Date    Hernia repair           Appendectomy      Cholecystectomy      Hysterectomy       section, classic       4 total    Cardiac surgery      Coronary stent placement       Family History   Problem Relation Age of Onset    Throat cancer Father     Heart attack Father     Uterine cancer Mother     Heart attack Mother     Lung cancer Paternal Aunt     Bone cancer Paternal Aunt     Prostate cancer Paternal Uncle     Diabetes Maternal Grandmother      Social History   Substance Use " Topics    Smoking status: Current Every Day Smoker     Packs/day: 0.50     Types: Cigarettes    Smokeless tobacco: None    Alcohol use No     Review of Systems   Constitutional: Negative.    Eyes: Negative.    Respiratory: Positive for cough.    Cardiovascular: Negative.    Gastrointestinal:        DIARRHEA, DIFFUSE CRAMPING   Endocrine: Negative.    Genitourinary: Positive for dysuria, frequency and urgency.   Musculoskeletal: Negative.    Skin: Negative.    Allergic/Immunologic: Negative.    Neurological: Negative.    Psychiatric/Behavioral: Positive for sleep disturbance.        DEPRESSION, GRIEF REACTION, INSOMNIA       Physical Exam   Initial Vitals   BP Pulse Resp Temp SpO2   01/28/17 2014 01/28/17 2014 01/28/17 2014 01/28/17 2014 01/28/17 2014   122/59 86 18 98 °F (36.7 °C) 96 %     Physical Exam    Nursing note and vitals reviewed.  Constitutional: She appears well-developed and well-nourished.   HENT:   Nose: Nose normal.   Mouth/Throat: Oropharynx is clear and moist.   Eyes: EOM are normal. Pupils are equal, round, and reactive to light.   Neck: Normal range of motion. Neck supple.   Cardiovascular: Normal rate, regular rhythm and intact distal pulses.   Pulmonary/Chest: Breath sounds normal.   Abdominal: Soft. Bowel sounds are normal. There is no rebound.   SUPRAPUBIC PAIN (SUBJETIVE BILATERAL UPPER ABD PAIN   Neurological: She is alert. She has normal reflexes.   Skin: Skin is warm and dry.         ED Course   Procedures  Labs Reviewed   URINALYSIS             Medical Decision Making:   Clinical Tests:   Lab Tests: Ordered and Reviewed       <> Summary of Lab: Abnormal us pos nitrate, pos wbc                   ED Course     Clinical Impression:   The encounter diagnosis was Acute cystitis without hematuria.    Disposition:   Disposition: Discharged       Eugene Mariano MD  01/28/17 8555

## 2017-01-29 NOTE — ED TRIAGE NOTES
"Pt presents to ED c/o lower abdominal pain that began yesterday but worse today. Describes pain as cramping and "contractions." Rates pain 10/10. C/o urinary frequency as well. Pt has been drinking cranberry and taking Azo for symptoms with no relief. Pt is tender to lower abdomen. She is tearful and anxious.   "

## 2017-01-31 LAB — BACTERIA UR CULT: NORMAL

## 2017-02-03 ENCOUNTER — HOSPITAL ENCOUNTER (OUTPATIENT)
Dept: RADIOLOGY | Facility: HOSPITAL | Age: 47
Discharge: HOME | End: 2017-02-03
Attending: INTERNAL MEDICINE
Payer: MEDICAID

## 2017-02-03 ENCOUNTER — HOSPITAL ENCOUNTER (OUTPATIENT)
Dept: CARDIOLOGY | Facility: HOSPITAL | Age: 47
Discharge: HOME | End: 2017-02-03
Attending: INTERNAL MEDICINE
Payer: MEDICAID

## 2017-02-03 DIAGNOSIS — R07.9 CHEST PAIN, UNSPECIFIED: ICD-10-CM

## 2017-02-03 DIAGNOSIS — I25.10 CORONARY ATHEROSCLEROSIS OF UNSPECIFIED TYPE OF VESSEL, NATIVE OR GRAFT: ICD-10-CM

## 2017-02-03 LAB — DIASTOLIC DYSFUNCTION: NO

## 2017-02-03 PROCEDURE — 78452 HT MUSCLE IMAGE SPECT MULT: CPT | Mod: 26,,, | Performed by: RADIOLOGY

## 2017-02-03 PROCEDURE — 78452 HT MUSCLE IMAGE SPECT MULT: CPT | Mod: TC

## 2017-02-15 NOTE — PROVIDER PROGRESS NOTES - EMERGENCY DEPT.
Encounter Date: 1/28/2017    ED Physician Progress Notes        Physician Note:   Culture follow-up:  Patient's urine culture showed Escherichia coli resistant to Bactrim.  We'll call in Cipro at [facility].  Call patient and she understands.

## 2017-06-22 ENCOUNTER — HOSPITAL ENCOUNTER (EMERGENCY)
Facility: HOSPITAL | Age: 47
Discharge: HOME | End: 2017-06-22
Attending: EMERGENCY MEDICINE
Payer: MEDICAID

## 2017-06-22 VITALS
HEIGHT: 61 IN | DIASTOLIC BLOOD PRESSURE: 82 MMHG | TEMPERATURE: 98 F | HEART RATE: 81 BPM | SYSTOLIC BLOOD PRESSURE: 135 MMHG | WEIGHT: 201 LBS | BODY MASS INDEX: 37.95 KG/M2 | OXYGEN SATURATION: 98 % | RESPIRATION RATE: 18 BRPM

## 2017-06-22 DIAGNOSIS — S82.402G TIBIA/FIBULA FRACTURE, LEFT, CLOSED, WITH DELAYED HEALING, SUBSEQUENT ENCOUNTER: Primary | ICD-10-CM

## 2017-06-22 DIAGNOSIS — S82.409A FIBULA FRACTURE: ICD-10-CM

## 2017-06-22 DIAGNOSIS — S82.202G TIBIA/FIBULA FRACTURE, LEFT, CLOSED, WITH DELAYED HEALING, SUBSEQUENT ENCOUNTER: Primary | ICD-10-CM

## 2017-06-22 PROCEDURE — 25000003 PHARM REV CODE 250: Performed by: EMERGENCY MEDICINE

## 2017-06-22 PROCEDURE — 99283 EMERGENCY DEPT VISIT LOW MDM: CPT

## 2017-06-22 RX ORDER — IBUPROFEN 400 MG/1
800 TABLET ORAL
Status: COMPLETED | OUTPATIENT
Start: 2017-06-22 | End: 2017-06-22

## 2017-06-22 RX ADMIN — IBUPROFEN 800 MG: 400 TABLET, FILM COATED ORAL at 10:06

## 2017-06-23 NOTE — ED NOTES
MD at bedside for cast removal. Pt tolerated well. Foot cleaned. Pt informed new splint will be placed. Safety maintained.
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
## 2017-08-24 ENCOUNTER — HOSPITAL ENCOUNTER (EMERGENCY)
Facility: HOSPITAL | Age: 47
Discharge: HOME | End: 2017-08-25
Attending: EMERGENCY MEDICINE
Payer: MEDICAID

## 2017-08-24 DIAGNOSIS — R07.9 CHEST PAIN, UNSPECIFIED TYPE: Primary | ICD-10-CM

## 2017-08-24 LAB
ALBUMIN SERPL BCP-MCNC: 4.4 G/DL
ALP SERPL-CCNC: 81 U/L
ALT SERPL W/O P-5'-P-CCNC: 23 U/L
ANION GAP SERPL CALC-SCNC: 9 MMOL/L
AST SERPL-CCNC: 18 U/L
BASOPHILS # BLD AUTO: 0.06 K/UL
BASOPHILS NFR BLD: 0.6 %
BILIRUB SERPL-MCNC: 0.3 MG/DL
BUN SERPL-MCNC: 12 MG/DL
CALCIUM SERPL-MCNC: 9.4 MG/DL
CHLORIDE SERPL-SCNC: 111 MMOL/L
CO2 SERPL-SCNC: 23 MMOL/L
CREAT SERPL-MCNC: 0.75 MG/DL
DIFFERENTIAL METHOD: ABNORMAL
EOSINOPHIL # BLD AUTO: 0.2 K/UL
EOSINOPHIL NFR BLD: 2.3 %
ERYTHROCYTE [DISTWIDTH] IN BLOOD BY AUTOMATED COUNT: 14.6 %
EST. GFR  (AFRICAN AMERICAN): >60 ML/MIN/1.73 M^2
EST. GFR  (NON AFRICAN AMERICAN): >60 ML/MIN/1.73 M^2
GLUCOSE SERPL-MCNC: 95 MG/DL
HCT VFR BLD AUTO: 40.8 %
HGB BLD-MCNC: 13.3 G/DL
INR PPP: 0.9
LYMPHOCYTES # BLD AUTO: 2.8 K/UL
LYMPHOCYTES NFR BLD: 28 %
MCH RBC QN AUTO: 29.6 PG
MCHC RBC AUTO-ENTMCNC: 32.6 G/DL
MCV RBC AUTO: 91 FL
MONOCYTES # BLD AUTO: 0.8 K/UL
MONOCYTES NFR BLD: 7.6 %
NEUTROPHILS # BLD AUTO: 6.2 K/UL
NEUTROPHILS NFR BLD: 61.3 %
NT-PROBNP: 54 PG/ML
PLATELET # BLD AUTO: 291 K/UL
PMV BLD AUTO: 10.3 FL
POTASSIUM SERPL-SCNC: 3.9 MMOL/L
PROT SERPL-MCNC: 7.7 G/DL
PROTHROMBIN TIME: 9.8 SEC
RBC # BLD AUTO: 4.49 M/UL
SODIUM SERPL-SCNC: 143 MMOL/L
TROPONIN I SERPL DL<=0.01 NG/ML-MCNC: <0.012 NG/ML
WBC # BLD AUTO: 10.09 K/UL

## 2017-08-24 PROCEDURE — 84484 ASSAY OF TROPONIN QUANT: CPT

## 2017-08-24 PROCEDURE — 36000 PLACE NEEDLE IN VEIN: CPT

## 2017-08-24 PROCEDURE — 83880 ASSAY OF NATRIURETIC PEPTIDE: CPT

## 2017-08-24 PROCEDURE — 80053 COMPREHEN METABOLIC PANEL: CPT

## 2017-08-24 PROCEDURE — 93005 ELECTROCARDIOGRAM TRACING: CPT

## 2017-08-24 PROCEDURE — 99284 EMERGENCY DEPT VISIT MOD MDM: CPT | Mod: 25

## 2017-08-24 PROCEDURE — 85610 PROTHROMBIN TIME: CPT

## 2017-08-24 PROCEDURE — 85025 COMPLETE CBC W/AUTO DIFF WBC: CPT

## 2017-08-24 PROCEDURE — 93010 ELECTROCARDIOGRAM REPORT: CPT | Mod: ,,, | Performed by: INTERNAL MEDICINE

## 2017-08-24 PROCEDURE — 25000003 PHARM REV CODE 250: Performed by: EMERGENCY MEDICINE

## 2017-08-24 RX ORDER — ONDANSETRON 4 MG/1
4 TABLET, ORALLY DISINTEGRATING ORAL
Status: COMPLETED | OUTPATIENT
Start: 2017-08-24 | End: 2017-08-24

## 2017-08-24 RX ORDER — NITROGLYCERIN 0.4 MG/1
0.4 TABLET SUBLINGUAL EVERY 5 MIN PRN
Status: DISCONTINUED | OUTPATIENT
Start: 2017-08-24 | End: 2017-08-25 | Stop reason: HOSPADM

## 2017-08-24 RX ADMIN — NITROGLYCERIN 0.4 MG: 0.4 TABLET SUBLINGUAL at 10:08

## 2017-08-24 RX ADMIN — ONDANSETRON 4 MG: 4 TABLET, ORALLY DISINTEGRATING ORAL at 09:08

## 2017-08-25 VITALS
HEART RATE: 61 BPM | OXYGEN SATURATION: 98 % | DIASTOLIC BLOOD PRESSURE: 65 MMHG | RESPIRATION RATE: 15 BRPM | BODY MASS INDEX: 35.12 KG/M2 | WEIGHT: 186 LBS | TEMPERATURE: 98 F | HEIGHT: 61 IN | SYSTOLIC BLOOD PRESSURE: 134 MMHG

## 2017-08-25 LAB — TROPONIN I SERPL DL<=0.01 NG/ML-MCNC: <0.012 NG/ML

## 2017-08-25 PROCEDURE — 84484 ASSAY OF TROPONIN QUANT: CPT

## 2017-08-25 RX ORDER — ONDANSETRON 4 MG/1
8 TABLET, ORALLY DISINTEGRATING ORAL EVERY 6 HOURS PRN
Qty: 12 TABLET | Refills: 0 | Status: SHIPPED | OUTPATIENT
Start: 2017-08-25

## 2017-08-25 NOTE — ED NOTES
1st encounter, patient sitting up in bed, states she was at home folding clothes when she had a sudden onset of mid-sternal non radiating chest pressure with nausea at 1800.  Patient has a hx of cardiac stents and took 1 SL NTG with relief of pressure from 9/10 to 6/10.  Patient states she is also having tingling, no numbness, in bilateral lower extremities and SOB.

## 2017-08-25 NOTE — ED PROVIDER NOTES
Encounter Date: 2017       History     Chief Complaint   Patient presents with    Chest Pain     chest pain started about 2 hrs pta. midsternal radiates to left shoulder and down left arm. pain is an intermittent pressure. pain is accompanied with sob, nausea, and diaphoresis. hx of stents. pt took 1 nitro pta with some relief. aox4 skin w/d. resp even and nonlabored.      The history is provided by the patient.   Chest Pain   The current episode started 2 to 3 hours ago. Chest pain occurs constantly. The chest pain is unchanged. At its most intense, the chest pain is at 4/10. The chest pain is currently at 4/10. The quality of the pain is described as aching and dull. The pain does not radiate. Pertinent negatives for primary symptoms include no fever, no fatigue, no shortness of breath, no cough, no wheezing, no palpitations, no abdominal pain, no nausea and no vomiting.   Pertinent negatives for associated symptoms include no claudication and no diaphoresis. She tried nothing for the symptoms. Risk factors include alcohol intake, obesity, lack of exercise, sedentary lifestyle and smoking/tobacco exposure.   Her past medical history is significant for CAD, hyperlipidemia and hypertension.   Pertinent negatives for past medical history include no cancer, no congenital heart disease, no connective tissue disease, no COPD, no CHF, no MI and no PVD.     Review of patient's allergies indicates:   Allergen Reactions    Augmentin  [amoxicillin-pot clavulanate]      Other reaction(s): Rash     Past Medical History:   Diagnosis Date    Anxiety     Coronary artery disease     Depression     Diabetes mellitus     Headache(784.0)     High cholesterol     Hypertension     Migraine headache     Seizures     Umbilical hernia      Past Surgical History:   Procedure Laterality Date    APPENDECTOMY      CARDIAC SURGERY       SECTION, CLASSIC      4 total    CHOLECYSTECTOMY      CORONARY STENT PLACEMENT       HERNIA REPAIR      jan,2012    HYSTERECTOMY       Family History   Problem Relation Age of Onset    Throat cancer Father     Heart attack Father     Uterine cancer Mother     Heart attack Mother     Lung cancer Paternal Aunt     Bone cancer Paternal Aunt     Prostate cancer Paternal Uncle     Diabetes Maternal Grandmother      Social History   Substance Use Topics    Smoking status: Current Every Day Smoker     Packs/day: 0.50     Types: Cigarettes    Smokeless tobacco: Never Used    Alcohol use No     Review of Systems   Constitutional: Negative for diaphoresis, fatigue and fever.   Respiratory: Negative for cough, shortness of breath and wheezing.    Cardiovascular: Positive for chest pain. Negative for palpitations and claudication.   Gastrointestinal: Negative for abdominal pain, nausea and vomiting.   All other systems reviewed and are negative.      Physical Exam     Initial Vitals [08/24/17 2112]   BP Pulse Resp Temp SpO2   (!) 179/87 76 16 98.7 °F (37.1 °C) 99 %      MAP       117.67         Physical Exam    Nursing note and vitals reviewed.  Constitutional: She appears well-developed and well-nourished.   HENT:   Head: Normocephalic and atraumatic.   Eyes: EOM are normal.   Neck: Normal range of motion. Neck supple.   Cardiovascular: Normal rate, regular rhythm, normal heart sounds and intact distal pulses.   Pulmonary/Chest: Breath sounds normal.   Abdominal: Soft.   Musculoskeletal: Normal range of motion.   Neurological: She is alert and oriented to person, place, and time.   Skin: Skin is warm and dry. Capillary refill takes less than 2 seconds.   Psychiatric: She has a normal mood and affect. Her behavior is normal. Judgment and thought content normal.         ED Course   Procedures  Labs Reviewed   CBC W/ AUTO DIFFERENTIAL - Abnormal; Notable for the following:        Result Value    RDW 14.6 (*)     All other components within normal limits   COMPREHENSIVE METABOLIC PANEL -  Abnormal; Notable for the following:     Chloride 111 (*)     All other components within normal limits   TROPONIN I   PROTIME-INR   NT-PRO NATRIURETIC PEPTIDE   TROPONIN I     EKG Readings: (Independently Interpreted)   Rhythm: Normal Sinus Rhythm. Heart Rate: 78. Ectopy: No Ectopy. Conduction: Normal. ST Segments: Normal ST Segments. T Waves: Normal. Clinical Impression: Normal Sinus Rhythm       X-Rays:   Independently Interpreted Readings:   Chest X-Ray: Normal heart size.  No infiltrates.  No acute abnormalities.     Medical Decision Making:   ED Management:  Cardiac workup with repeat troponin at 3 hours did not reveal any acute coronary syndrome.                   ED Course     Clinical Impression:   The encounter diagnosis was Chest pain, unspecified type.    Disposition:   Disposition: Discharged  Condition: Stable                        Daniella Ruano MD  08/25/17 0054

## 2017-08-25 NOTE — ED NOTES
Patient states she has already had one full dose ASA tablet this am.  Will notify MD of complaint of chest pain.

## 2017-09-08 PROCEDURE — 99283 EMERGENCY DEPT VISIT LOW MDM: CPT

## 2017-09-09 ENCOUNTER — HOSPITAL ENCOUNTER (EMERGENCY)
Facility: HOSPITAL | Age: 47
Discharge: HOME | End: 2017-09-09
Attending: EMERGENCY MEDICINE
Payer: MEDICAID

## 2017-09-09 VITALS
DIASTOLIC BLOOD PRESSURE: 83 MMHG | RESPIRATION RATE: 18 BRPM | TEMPERATURE: 98 F | HEIGHT: 61 IN | WEIGHT: 170 LBS | HEART RATE: 88 BPM | SYSTOLIC BLOOD PRESSURE: 174 MMHG | BODY MASS INDEX: 32.1 KG/M2 | OXYGEN SATURATION: 99 %

## 2017-09-09 DIAGNOSIS — S90.31XA CONTUSION OF FOOT, RIGHT: Primary | ICD-10-CM

## 2017-09-09 DIAGNOSIS — R52 PAIN: ICD-10-CM

## 2017-09-09 PROCEDURE — 25000003 PHARM REV CODE 250: Performed by: EMERGENCY MEDICINE

## 2017-09-09 RX ORDER — OXYCODONE AND ACETAMINOPHEN 5; 325 MG/1; MG/1
2 TABLET ORAL
Status: COMPLETED | OUTPATIENT
Start: 2017-09-09 | End: 2017-09-09

## 2017-09-09 RX ORDER — TRAMADOL HYDROCHLORIDE AND ACETAMINOPHEN 37.5; 325 MG/1; MG/1
1-2 TABLET, FILM COATED ORAL EVERY 6 HOURS PRN
Qty: 20 TABLET | Refills: 0 | Status: SHIPPED | OUTPATIENT
Start: 2017-09-09

## 2017-09-09 RX ADMIN — OXYCODONE AND ACETAMINOPHEN 2 TABLET: 5; 325 TABLET ORAL at 01:09

## 2017-09-09 NOTE — ED PROVIDER NOTES
Encounter Date: 2017       History     Chief Complaint   Patient presents with    Foot Injury     tripped and twisted foot thie evening     Patient 47-year-old female who presents with a chief complaint of pain to the right foot after a trip and fall this evening.  She was playing with her grandchild when her flip-flop caused her to trip, and she landed awkwardly with her foot on the ground.  She denies any loss consciousness.  There's been no previous injury.          Review of patient's allergies indicates:   Allergen Reactions    Augmentin  [amoxicillin-pot clavulanate]      Other reaction(s): Rash     Past Medical History:   Diagnosis Date    Anxiety     Coronary artery disease     Depression     Diabetes mellitus     Headache(784.0)     High cholesterol     Hypertension     Migraine headache     Seizures     Umbilical hernia      Past Surgical History:   Procedure Laterality Date    APPENDECTOMY      CARDIAC SURGERY       SECTION, CLASSIC      4 total    CHOLECYSTECTOMY      CORONARY STENT PLACEMENT      HERNIA REPAIR          HYSTERECTOMY       Family History   Problem Relation Age of Onset    Throat cancer Father     Heart attack Father     Uterine cancer Mother     Heart attack Mother     Lung cancer Paternal Aunt     Bone cancer Paternal Aunt     Prostate cancer Paternal Uncle     Diabetes Maternal Grandmother      Social History   Substance Use Topics    Smoking status: Current Every Day Smoker     Packs/day: 0.50     Types: Cigarettes    Smokeless tobacco: Never Used    Alcohol use No     Review of Systems   Constitutional: Negative for fever.   HENT: Negative for sore throat.    Respiratory: Negative for shortness of breath.    Cardiovascular: Negative for chest pain.   Gastrointestinal: Negative for nausea.   Genitourinary: Negative for dysuria.   Musculoskeletal: Negative for back pain.   Skin: Negative for rash.   Neurological: Negative for weakness.  "  Hematological: Does not bruise/bleed easily.   All other systems reviewed and are negative.      Physical Exam     Initial Vitals [09/08/17 2342]   BP Pulse Resp Temp SpO2   (!) 183/90 100 20 98.1 °F (36.7 °C) 100 %      MAP       121         Physical Exam    Nursing note and vitals reviewed.  Constitutional: Vital signs are normal. She appears well-developed and well-nourished. She is cooperative.  Non-toxic appearance.   HENT:   Head: Normocephalic and atraumatic.   Eyes: Conjunctivae, EOM and lids are normal.   Neck: Trachea normal and normal range of motion. Neck supple. No stridor present. No tracheal deviation present. No neck rigidity. No Brudzinski's sign and no Kernig's sign noted.   Cardiovascular: Normal rate, regular rhythm, normal heart sounds and normal pulses.   Abdominal: Soft. Normal appearance and bowel sounds are normal. She exhibits no abdominal bruit. There is no tenderness. There is no rebound.   Neurological: She is alert and oriented to person, place, and time. She has normal strength. GCS eye subscore is 4. GCS verbal subscore is 5. GCS motor subscore is 6.   Skin: Skin is warm, dry and intact. Capillary refill takes less than 2 seconds.   Psychiatric: She has a normal mood and affect. Her behavior is normal. Judgment normal. Thought content is not delusional. She expresses no homicidal and no suicidal ideation.       - none    Vitals:    09/08/17 2342   BP: (!) 183/90   Pulse: 100   Resp: 20   Temp: 98.1 °F (36.7 °C)   TempSrc: Oral   SpO2: 100%   Weight: 77.1 kg (170 lb)   Height: 5' 1" (1.549 m)            Imaging Results          X-Ray Foot Complete Right (In process)                I discussed with patient and/or family/caretaker that negative X-ray does not rule out occult fracture or other soft tissue injury.  Persistent pain greater than 7-10 days or increased pain requires follow up, specifically with orthopedics.       The above test results and vital signs have been reviewed by " the physician.      ED Course   Procedures  Labs Reviewed - No data to display       X-Rays:   Independently Interpreted Readings:   Other Readings:  X-ray of the right foot does not reveal any evidence of acute fracture.         I have a low suspicion for medical, surgical or other life threatening illness and I believe patient is safe for discharge.  I specifically counseled the patient and/or family/caretaker that despite an unrevealing evaluation in the ED, patient may still be at risk for serious, even life threatening illness.  I have attempted to answer all questions related to her stay today.  I have given the patient explicit instructions to return immediately for any worsening or change in current symptoms, or for any concern.   2:11 AM - Re-evaluation:  The patient is resting comfortably and is in no acute distress. Discussed test results and notified of pending labs. Answered questions at this time.                 ED Course      Clinical Impression:   The primary encounter diagnosis was Contusion of foot, right. A diagnosis of Pain was also pertinent to this visit.                           Ranjeet Austin MD  09/09/17 0214

## 2017-09-09 NOTE — ED NOTES
Assisting primary RN; patient c/o severe right foot pain, requesting something for pain.  MD and Primary RN notified, awaiting further orders.

## 2019-08-25 ENCOUNTER — HOSPITAL ENCOUNTER (EMERGENCY)
Facility: HOSPITAL | Age: 49
Discharge: HOME | End: 2019-08-25
Attending: FAMILY MEDICINE
Payer: MEDICAID

## 2019-08-25 VITALS
SYSTOLIC BLOOD PRESSURE: 170 MMHG | TEMPERATURE: 99 F | DIASTOLIC BLOOD PRESSURE: 78 MMHG | RESPIRATION RATE: 20 BRPM | HEIGHT: 61 IN | OXYGEN SATURATION: 99 % | BODY MASS INDEX: 32.1 KG/M2 | WEIGHT: 170 LBS | HEART RATE: 65 BPM

## 2019-08-25 DIAGNOSIS — L84 CALLUS OF FOOT: Primary | ICD-10-CM

## 2019-08-25 PROCEDURE — 25000003 PHARM REV CODE 250: Mod: ER | Performed by: PHYSICIAN ASSISTANT

## 2019-08-25 PROCEDURE — 99283 EMERGENCY DEPT VISIT LOW MDM: CPT | Mod: ER

## 2019-08-25 RX ORDER — KETOROLAC TROMETHAMINE 10 MG/1
10 TABLET, FILM COATED ORAL EVERY 6 HOURS
Qty: 10 TABLET | Refills: 0 | Status: SHIPPED | OUTPATIENT
Start: 2019-08-25

## 2019-08-25 RX ORDER — KETOCONAZOLE 20 MG/G
CREAM TOPICAL DAILY
Qty: 15 G | Refills: 0 | Status: SHIPPED | OUTPATIENT
Start: 2019-08-25

## 2019-08-25 RX ORDER — ACETAMINOPHEN 325 MG/1
650 TABLET ORAL
Status: COMPLETED | OUTPATIENT
Start: 2019-08-25 | End: 2019-08-25

## 2019-08-25 RX ORDER — MUPIROCIN 20 MG/G
OINTMENT TOPICAL 3 TIMES DAILY
Qty: 15 G | Refills: 0 | Status: SHIPPED | OUTPATIENT
Start: 2019-08-25

## 2019-08-25 RX ADMIN — ACETAMINOPHEN 650 MG: 325 TABLET ORAL at 01:08

## 2019-08-25 NOTE — DISCHARGE INSTRUCTIONS
Your advised to alternate the ketoconazole cream  and the mupirocin cream.  You are instructed to follow up with your dermatologist for re-evaluation within 3 days.  You are instructed to return to the emergency department immediately for any new or worsening symptoms.

## 2019-08-26 NOTE — ED PROVIDER NOTES
"Encounter Date: 2019       History     Chief Complaint   Patient presents with    Foot Problem     Pt states has pain to bottom of foot, "under my big toe."  States has been "treating as a callus for months", states pain worse.       48-year-old presents the emergency department for evaluation of several month history of callus to her right foot.  She reports that a callus began gradually 3 months ago that is been constant since onset.  She reports that she has been attempting treatment with lotions and a pumice stone with no relief of symptoms. She reports that she has used insoles in her shoes and foot cushioned pads with only mild relief of symptoms. She denies any redness, numbness, tingling, weakness or swelling to the lower extremities. She denies any fever, headache, dizziness, foot trauma, nausea and vomiting.        Review of patient's allergies indicates:   Allergen Reactions    Augmentin  [amoxicillin-pot clavulanate]      Other reaction(s): Rash     Past Medical History:   Diagnosis Date    Anxiety     Coronary artery disease     Depression     Diabetes mellitus     Headache(784.0)     High cholesterol     Hypertension     Migraine headache     Seizures     Umbilical hernia      Past Surgical History:   Procedure Laterality Date    APPENDECTOMY      CARDIAC SURGERY       SECTION, CLASSIC      4 total    CHOLECYSTECTOMY      CORONARY STENT PLACEMENT      HERNIA REPAIR          HYSTERECTOMY       Family History   Problem Relation Age of Onset    Throat cancer Father     Heart attack Father     Uterine cancer Mother     Heart attack Mother     Lung cancer Paternal Aunt     Bone cancer Paternal Aunt     Prostate cancer Paternal Uncle     Diabetes Maternal Grandmother      Social History     Tobacco Use    Smoking status: Current Every Day Smoker     Packs/day: 0.50     Types: Cigarettes    Smokeless tobacco: Never Used   Substance Use Topics    Alcohol use: " No    Drug use: No     Review of Systems   Constitutional: Negative for activity change, appetite change and fever.   HENT: Negative for congestion, rhinorrhea, sinus pressure, sore throat, trouble swallowing and voice change.    Eyes: Negative for redness and visual disturbance.   Respiratory: Negative for cough, chest tightness, shortness of breath and wheezing.    Cardiovascular: Negative for chest pain.   Gastrointestinal: Negative for abdominal pain, constipation, diarrhea, nausea and vomiting.   Genitourinary: Negative for decreased urine volume, dysuria and flank pain.   Musculoskeletal: Negative for back pain, joint swelling, neck pain and neck stiffness.   Skin: Negative for rash.   Neurological: Negative for dizziness, syncope, weakness, light-headedness, numbness and headaches.       Physical Exam     Initial Vitals [08/25/19 1157]   BP Pulse Resp Temp SpO2   (!) 174/88 70 18 98.9 °F (37.2 °C) 99 %      MAP       --         Physical Exam    Nursing note and vitals reviewed.  Constitutional: She appears well-developed and well-nourished. She is not diaphoretic. No distress.   HENT:   Head: Normocephalic and atraumatic.   Right Ear: External ear normal.   Left Ear: External ear normal.   Nose: Nose normal.   Mouth/Throat: Oropharynx is clear and moist.   Eyes: Conjunctivae and EOM are normal. Pupils are equal, round, and reactive to light.   Neck: Normal range of motion. Neck supple.   Cardiovascular: Normal rate, regular rhythm and normal heart sounds.   Pulmonary/Chest: Breath sounds normal. No respiratory distress. She has no wheezes. She has no rhonchi. She has no rales. She exhibits no tenderness.   Musculoskeletal:        Left ankle: She exhibits normal range of motion, no swelling and no ecchymosis. No tenderness.        Right foot: There is tenderness. There is normal range of motion, no bony tenderness and no swelling.        Feet:    Lymphadenopathy:     She has no cervical adenopathy.    Neurological: She is alert and oriented to person, place, and time.   Skin: Skin is warm and dry.   Psychiatric: She has a normal mood and affect.         ED Course   Procedures  Labs Reviewed - No data to display       Imaging Results    None          Medical Decision Making:   Initial Assessment:   48-year-old female presents for evaluation of callus her right foot.  Physical exam reveals a nontoxic-appearing female in no acute distress. Patient is afebrile vital signs within normal limits.  Neurological exam reveals an alert and oriented patient.  Neck is supple, nontender to palpation. Lungs clear to auscultation bilaterally. Examination of the right lower extremity reveals 1 x 1 cm callus noted to the dorsal aspect of the right proximal foot.  No surrounding erythema, edema or induration noted.  No fluctuance noted. No ulcerations, vesicles, pustules or foreign bodies noted.  Full range of motion, sensation and peripheral pulses intact in lower extremities bilaterally.  Differential Diagnosis:   Callus  No evidence of imbedded foreign body, cellulitis, abscess or puncture wound  ED Management:  Discussed these findings at length patient verbalizes understanding and agreement course of treatment.  Instructed patient to follow up with her primary care provider for possible podiatry referral.  Instructed patient to return to the emergency department immediately for any new or worsening symptoms right                      Clinical Impression:       ICD-10-CM ICD-9-CM   1. Callus of foot L84 700                                Samra Carrillo PA-C  08/25/19 7233

## 2019-11-27 ENCOUNTER — HOSPITAL ENCOUNTER (OUTPATIENT)
Dept: RADIOLOGY | Facility: HOSPITAL | Age: 49
Discharge: HOME | End: 2019-11-27
Attending: PODIATRIST
Payer: MEDICAID

## 2019-11-27 DIAGNOSIS — Z87.821 PERSONAL HISTORY OF RETAINED FOREIGN BODY FULLY REMOVED: ICD-10-CM

## 2019-11-27 PROCEDURE — 73718 MRI LOWER EXTREMITY W/O DYE: CPT | Mod: TC,PO,RT
